# Patient Record
Sex: FEMALE | Race: WHITE | Employment: PART TIME | URBAN - METROPOLITAN AREA
[De-identification: names, ages, dates, MRNs, and addresses within clinical notes are randomized per-mention and may not be internally consistent; named-entity substitution may affect disease eponyms.]

---
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
## 2022-01-14 ENCOUNTER — IMMUNIZATION (OUTPATIENT)
Dept: NURSING | Facility: CLINIC | Age: 22
End: 2022-01-14
Attending: FAMILY MEDICINE
Payer: OTHER GOVERNMENT

## 2022-01-14 PROCEDURE — 0052A COVID-19,PF,PFIZER (12+ YRS): CPT

## 2022-01-14 PROCEDURE — 91305 COVID-19,PF,PFIZER (12+ YRS): CPT

## 2022-03-11 ENCOUNTER — OFFICE VISIT (OUTPATIENT)
Dept: FAMILY MEDICINE | Facility: CLINIC | Age: 22
End: 2022-03-11
Payer: OTHER GOVERNMENT

## 2022-03-11 VITALS
HEART RATE: 61 BPM | RESPIRATION RATE: 13 BRPM | OXYGEN SATURATION: 99 % | TEMPERATURE: 97 F | SYSTOLIC BLOOD PRESSURE: 125 MMHG | DIASTOLIC BLOOD PRESSURE: 78 MMHG | BODY MASS INDEX: 21.96 KG/M2 | WEIGHT: 144.4 LBS

## 2022-03-11 DIAGNOSIS — L21.9 SEBORRHEIC DERMATITIS OF SCALP: Primary | ICD-10-CM

## 2022-03-11 DIAGNOSIS — L70.0 ACNE VULGARIS: ICD-10-CM

## 2022-03-11 PROCEDURE — 99214 OFFICE O/P EST MOD 30 MIN: CPT | Performed by: NURSE PRACTITIONER

## 2022-03-11 RX ORDER — TRIAMCINOLONE ACETONIDE 0.25 MG/G
CREAM TOPICAL 2 TIMES DAILY
Qty: 80 G | Refills: 1 | Status: SHIPPED | OUTPATIENT
Start: 2022-03-11 | End: 2022-11-30

## 2022-03-11 RX ORDER — KETOCONAZOLE 20 MG/ML
SHAMPOO TOPICAL
Qty: 120 ML | Refills: 1 | Status: SHIPPED | OUTPATIENT
Start: 2022-03-11 | End: 2022-11-30

## 2022-03-11 RX ORDER — TRETINOIN 0.25 MG/G
CREAM TOPICAL AT BEDTIME
Qty: 45 G | Refills: 3 | Status: SHIPPED | OUTPATIENT
Start: 2022-03-11 | End: 2022-11-30

## 2022-03-11 ASSESSMENT — PATIENT HEALTH QUESTIONNAIRE - PHQ9
SUM OF ALL RESPONSES TO PHQ QUESTIONS 1-9: 17
10. IF YOU CHECKED OFF ANY PROBLEMS, HOW DIFFICULT HAVE THESE PROBLEMS MADE IT FOR YOU TO DO YOUR WORK, TAKE CARE OF THINGS AT HOME, OR GET ALONG WITH OTHER PEOPLE: SOMEWHAT DIFFICULT
SUM OF ALL RESPONSES TO PHQ QUESTIONS 1-9: 17

## 2022-03-11 ASSESSMENT — PAIN SCALES - GENERAL: PAINLEVEL: NO PAIN (0)

## 2022-03-11 NOTE — PATIENT INSTRUCTIONS
At [facility], we strive to deliver an exceptional experience to you, every time we see you. If you receive a survey, please complete it as we do value your feedback.  If you have MyChart, you can expect to receive results automatically within 24 hours of their completion.  Your provider will send a note interpreting your results as well.   If you do not have MyChart, you should receive your results in about a week by mail.    Your care team:                            Family Medicine Internal Medicine   MD Kiet Garza MD Shantel Branch-Fleming, MD Srinivasa Vaka, MD Katya Belousova, BLU Hadley CNP, MD ([facility]   Kingston Harper, MD Lianet Abebe MD Amelia Massimini APRN CNP Kim Thein, APRN CNP Bethany Templen, MD             Clinic hours: Monday - Thursday 7 am-6 pm; Fridays 7 am-5 pm.   Urgent care: Monday - Friday 10 am- 8 pm; Saturday and Sunday 9 am-5 pm.    Clinic: [phone]       [facility]: Monday - Thursday 8 am - 7 pm; Friday 8 am - 6 pm  [facility]: [phone]

## 2022-03-11 NOTE — PROGRESS NOTES
Assessment & Plan     Seborrheic dermatitis of scalp  Discussed multiple etiologies, treatment options.    - ketoconazole (NIZORAL) 2 % external shampoo; Apply to scalp, rinse after 5 minutes.  Repeat twice weekly for 2-4 weeks.  - triamcinolone (KENALOG) 0.025 % cream; Apply topically 2 times PRN daily to scalp at affected site parietal scalp and other tala of dermatitis as needed   Referral ordered if needed for persistent symptoms.   - Adult Dermatology Referral; Future    Acne vulgaris  Skin care reviewed.   - tretinoin (RETIN-A) 0.025 % external cream; Apply topically At Bedtime     Return in about 1 month (around 4/11/2022), or if symptoms worsen or fail to improve, for Follow up.    BLU Arriola [facility]     Cheli Inman is a 22 year old who presents for the following health issues     Patient reports ongoing skin concerns:      Describes dryness, flaking throughout scalp, +pruritis.  R frontal/parietal scalp with areas of erythema, skin irritation, possible hair loss/thinning.    Patient notes previous area on scalp with linear pattern of scaling, dryness, flaking - but resolved with use of selenium sulfide shampoo.    Would like to discuss treatment options for dandruff and flaking.  Has tried dandruff shampoos, scalp moisturizers, hair masks, etc, with no improvement.   No isolated lesions, no crusting/weeping.    Washing hair daily makes scalp symptoms worse, so cleans hair less frequently.     Also notes facial acne.  Has used adapalene in past, with/without topical clindamycin, would like to trial alternative.       Review of Systems   Constitutional, neuro, ENT, endocrine, pulmonary, cardiac, gastrointestinal, genitourinary, musculoskeletal, integument and psychiatric systems are negative, except as otherwise noted.      Objective    /78 (BP Location: Left arm, Patient Position: Sitting, Cuff Size: Adult Regular)   Pulse 61   Temp 97  F  (36.1  C) (Tympanic)   Resp 13   Wt 65.5 kg (144 lb 6.4 oz)   SpO2 99%   BMI 21.96 kg/m    Body mass index is 21.96 kg/m .  Physical Exam   GENERAL: healthy, alert and no distress  EYES: Eyes grossly normal to inspection, PERRL and conjunctivae and sclerae normal  HENT: ear canals and TM's normal, nose and mouth without ulcers or lesions  NECK: no adenopathy, no asymmetry, masses, or scars and thyroid normal to palpation  RESP: lungs clear to auscultation - no rales, rhonchi or wheezes  CV: regular rate and rhythm, normal S1 S2 no murmur  MS: no gross musculoskeletal defects noted, no edema  SKIN: moderate facial acne.  Scalp with diffuse white flaking and dry skin. Few localised areas of erythema, rough skin. No scaling, no breakdown, no isolated papular lesions.   PSYCH: mentation appears normal, affect normal/bright

## 2022-09-03 ENCOUNTER — HEALTH MAINTENANCE LETTER (OUTPATIENT)
Age: 22
End: 2022-09-03

## 2022-11-30 ENCOUNTER — MYC MEDICAL ADVICE (OUTPATIENT)
Dept: FAMILY MEDICINE | Facility: CLINIC | Age: 22
End: 2022-11-30

## 2022-11-30 ENCOUNTER — OFFICE VISIT (OUTPATIENT)
Dept: FAMILY MEDICINE | Facility: CLINIC | Age: 22
End: 2022-11-30
Payer: OTHER GOVERNMENT

## 2022-11-30 VITALS
HEART RATE: 87 BPM | SYSTOLIC BLOOD PRESSURE: 122 MMHG | WEIGHT: 137.1 LBS | HEIGHT: 68 IN | BODY MASS INDEX: 20.78 KG/M2 | TEMPERATURE: 99.1 F | DIASTOLIC BLOOD PRESSURE: 82 MMHG | RESPIRATION RATE: 11 BRPM | OXYGEN SATURATION: 100 %

## 2022-11-30 DIAGNOSIS — Z02.89 ENCOUNTER FOR COMPLETION OF FORM WITH PATIENT: ICD-10-CM

## 2022-11-30 DIAGNOSIS — Z23 NEED FOR VACCINATION: ICD-10-CM

## 2022-11-30 DIAGNOSIS — F32.1 CURRENT MODERATE EPISODE OF MAJOR DEPRESSIVE DISORDER WITHOUT PRIOR EPISODE (H): Primary | ICD-10-CM

## 2022-11-30 DIAGNOSIS — L70.0 ACNE VULGARIS: ICD-10-CM

## 2022-11-30 PROCEDURE — 99214 OFFICE O/P EST MOD 30 MIN: CPT | Mod: 25 | Performed by: PHYSICIAN ASSISTANT

## 2022-11-30 PROCEDURE — 91312 COVID-19 VACCINE BIVALENT BOOSTER 12+ (PFIZER): CPT | Performed by: PHYSICIAN ASSISTANT

## 2022-11-30 PROCEDURE — 0124A COVID-19 VACCINE BIVALENT BOOSTER 12+ (PFIZER): CPT | Performed by: PHYSICIAN ASSISTANT

## 2022-11-30 RX ORDER — ADAPALENE 45 G/G
GEL TOPICAL AT BEDTIME
Qty: 45 G | Refills: 5 | Status: SHIPPED | OUTPATIENT
Start: 2022-11-30

## 2022-11-30 RX ORDER — TRETINOIN 0.25 MG/G
CREAM TOPICAL AT BEDTIME
Qty: 45 G | Refills: 5 | Status: SHIPPED | OUTPATIENT
Start: 2022-11-30

## 2022-11-30 ASSESSMENT — PAIN SCALES - GENERAL: PAINLEVEL: NO PAIN (0)

## 2022-11-30 ASSESSMENT — PATIENT HEALTH QUESTIONNAIRE - PHQ9
SUM OF ALL RESPONSES TO PHQ QUESTIONS 1-9: 17
SUM OF ALL RESPONSES TO PHQ QUESTIONS 1-9: 17
10. IF YOU CHECKED OFF ANY PROBLEMS, HOW DIFFICULT HAVE THESE PROBLEMS MADE IT FOR YOU TO DO YOUR WORK, TAKE CARE OF THINGS AT HOME, OR GET ALONG WITH OTHER PEOPLE: SOMEWHAT DIFFICULT

## 2022-11-30 ASSESSMENT — ENCOUNTER SYMPTOMS
NERVOUS/ANXIOUS: 1
ANOREXIA: 1

## 2022-11-30 NOTE — TELEPHONE ENCOUNTER
Form completed by staff and provider. Waiting to hear back from patient where form should be faxed. Holding upside down on my desk.

## 2022-11-30 NOTE — TELEPHONE ENCOUNTER
Reason for call:  Form  Reason for Call:  Form, our goal is to have forms completed with 72 hours, however, some forms may require a visit or additional information.    Type of letter, form or note:  school     Who is the form from?: patient sent in [employer] message. I printed it out and completed section with patient still in room.  (if other please explain)    Where did the form come from: Patient or family brought in       What clinic location was the form placed at?: [facility] [phone]    Where the form was placed: rory Wilson Box/Folder    What number is listed as a contact on the form?:        Additional comments:     Call taken on 11/30/2022 at 4:42 PM by Silvano Milton MA

## 2022-11-30 NOTE — PATIENT INSTRUCTIONS
You have been prescribed a medication to help with mood or anxiety: sertraline.    Take this medication once daily.     If instructed by your health care provider you may be advised to take a half tablet (half dose) daily for the first few days to reduce side effects and ease into a new medication.     Common side effects are nausea, headache, stomach upset or mild diarrhea. If you find it makes you sleepy, plan to take it at bedtime. Most of these side effects gradually improve over the first week of use.     It can take up to 2 weeks to notice initial benefits from the medication (ie increase in energy), and up to 4-6 weeks for other symptom improvement.     If you experience a worsening in mood or thoughts of self harm, seek help immediately.   Any Emergency Department if in crisis. After hours crisis line at [phone] or [phone]. [place] Crisis Text Line: Text [place] to 977522  or  Suicide LifeLine Chat: suicidepreventionRock[facility] Solos[facility].org/chat/    Non pharmacological treatment for depression and/or anxiety:  Counseling, Therapy or Cognitive Behavioral Therapy: In combination with medications, talking with a professional therapist can help reduce and improve symptoms of depression or anxiety. I can place a referral for you to arrange counseling through [employer]. You can also call your insurance to find covered therapists in your area. If the first person you see is not a good fit it is ok to try a different counselor.    Regular exercise reduces anxiety and depression symptoms. Being active 30-40 minutes per day 3-5 times per week can improve symptoms.    Reduce or avoid caffeine and alcohol. Caffeine can disrupt sleep and increase anxiety symptoms.  Alcohol has a depressive effect on the brain and works against the action of many medications.    Plan to follow up in 3-4 weeks .

## 2022-11-30 NOTE — PROGRESS NOTES
Assessment & Plan     Current moderate episode of major depressive disorder without prior episode (H)  New medication start  Discussed SSRI medication options, possible side effects, how to take, risks vs benefits, onset of sx improvement and alternatives.   Counseling referral discussed- can be seen on campus at [place] - was a good fit in the spring.   Questions answered  Start medication as below.   Follow up 3-4 weeks sooner if worsening. Crises resources discussed.  - sertraline (ZOLOFT) 50 MG tablet; Take 1 tablet (50 mg) by mouth daily    Acne vulgaris  Refilled medications  differin for neck  Retin A for face  - adapalene (DIFFERIN) 0.1 % external gel; Apply topically At Bedtime  - tretinoin (RETIN-A) 0.025 % external cream; Apply topically At Bedtime    Need for vaccination  Given   - REVIEW OF HEALTH MAINTENANCE PROTOCOL ORDERS  - COVID-19 VACCINE BIVALENT BOOSTER 12+ (PFIZER)    Completion of form   To start nursing clinicals  Vaccines updated today  In her email was form to be completed- Exam done and signed form.     Depression Screening Follow Up    PHQ 11/30/2022   PHQ-9 Total Score 17   Q9: Thoughts of better off dead/self-harm past 2 weeks Not at all       Follow Up: The patient was instructed to contact clinic for worsening symptoms, non-improvement in time frame as expected/discussed, and for questions regarding medications or treatment plan. For virtual visits, the patient was advised to be seen for in person evaluation if symptoms or condition are worsening or non-improvement as expected.       Return in about 4 weeks (around 12/28/2022).    GLENDA Magana [facility] HUNG Inman is a 22 year old accompanied by her self, presenting for the following health issues:  Anxiety and Depression      Anxiety  This is a new problem. The current episode started more than 1 month ago. The problem occurs constantly. The problem has been gradually worsening.  "Associated symptoms include anorexia. The symptoms are aggravated by stress. She has tried nothing for the symptoms.   History of Present Illness       Reason for visit:  Mental health and to update vaccinations (need online copies of vaccibation conformation for school)  Symptoms include:  Depression, loss of interest, changes in eating habits  Symptom intensity:  Moderate  Symptom progression:  Worsening  What makes it worse:  Recent family matters    She eats 2-3 servings of fruits and vegetables daily.She consumes 0 sweetened beverage(s) daily.She exercises with enough effort to increase her heart rate 20 to 29 minutes per day.  She exercises with enough effort to increase her heart rate 3 or less days per week.   She is taking medications regularly.    Today's PHQ-9         PHQ-9 Total Score: 17    PHQ-9 Q9 Thoughts of better off dead/self-harm past 2 weeks :   Not at all    How difficult have these problems made it for you to do your work, take care of things at home, or get along with other people: Somewhat difficult      Mental health   Feeling down, depression.   Low appetite and not eating well. Doesn't want to lose eight just hard to eat  Sleep- intermittently interrupted . Hard to fall asleep .   Concentration for school is good  Low motivation for school work. Avoiding checking emails  No SI/HI   Family issues and situation contributing.  Family stressors - mom went to skilled nursing for domestic issues w/boyfriend. Parents . Family has relied on Henny to manage issues w/mom.   Saw counselor in school at [place] last spring 2022 and was really helpful.  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  Not sure if worrying a lot.   Hard time relaxing.  Feels like when anxious can self sabotage.   Father has told her - \"you really have it together\" of the siblings (has older brother and younger sister). A lot of responsibility in the family.   Sister suicide attempt recently.   Never been on " "medications before.     PHQ 3/11/2022 11/30/2022   PHQ-9 Total Score 17 17   Q9: Thoughts of better off dead/self-harm past 2 weeks Not at all Not at all     No flowsheet data found.      Review of Systems   Gastrointestinal: Positive for anorexia.   Psychiatric/Behavioral: The patient is nervous/anxious.             Objective    /82 (BP Location: Left arm, Patient Position: Sitting, Cuff Size: Adult Regular)   Pulse 87   Temp 99.1  F (37.3  C) (Temporal)   Resp 11   Ht 1.738 m (5' 8.43\")   Wt 62.2 kg (137 lb 1.6 oz)   LMP 11/27/2022 (Approximate)   SpO2 100%   BMI 20.59 kg/m    Body mass index is 20.59 kg/m .  Physical Exam   GENERAL: healthy, alert and no distress  EYES: Eyes grossly normal to inspection, PERRL and conjunctivae and sclerae normal  HENT: ear canals and TM's normal, nose and mouth without ulcers or lesions  NECK: no adenopathy, no asymmetry, masses, or scars and thyroid normal to palpation  RESP: lungs clear to auscultation - no rales, rhonchi or wheezes  CV: regular rate and rhythm, normal S1 S2, no S3 or S4, no murmur, click or rub, no peripheral edema and peripheral pulses strong  ABDOMEN: soft, nontender, no masses and bowel sounds normal  MS: no gross musculoskeletal defects noted, no edema  NEURO: Normal strength and tone, mentation intact and speech normal  PSYCH: mentation appears normal, affect normal/bright, neatly groomed casually dressed, good eye contact, normal insight, no SI/HI  LYMPH: normal ant/post cervical, supraclavicular nodes                    "

## 2022-12-01 ASSESSMENT — ANXIETY QUESTIONNAIRES
GAD7 TOTAL SCORE: 10
IF YOU CHECKED OFF ANY PROBLEMS ON THIS QUESTIONNAIRE, HOW DIFFICULT HAVE THESE PROBLEMS MADE IT FOR YOU TO DO YOUR WORK, TAKE CARE OF THINGS AT HOME, OR GET ALONG WITH OTHER PEOPLE: SOMEWHAT DIFFICULT
6. BECOMING EASILY ANNOYED OR IRRITABLE: MORE THAN HALF THE DAYS
2. NOT BEING ABLE TO STOP OR CONTROL WORRYING: SEVERAL DAYS
5. BEING SO RESTLESS THAT IT IS HARD TO SIT STILL: SEVERAL DAYS
1. FEELING NERVOUS, ANXIOUS, OR ON EDGE: SEVERAL DAYS
3. WORRYING TOO MUCH ABOUT DIFFERENT THINGS: MORE THAN HALF THE DAYS
GAD7 TOTAL SCORE: 10
7. FEELING AFRAID AS IF SOMETHING AWFUL MIGHT HAPPEN: SEVERAL DAYS

## 2022-12-01 ASSESSMENT — PATIENT HEALTH QUESTIONNAIRE - PHQ9: 5. POOR APPETITE OR OVEREATING: MORE THAN HALF THE DAYS

## 2022-12-06 NOTE — TELEPHONE ENCOUNTER
Pt read [employer] but did not provide fax number. Mailing copy to patient. Sent copy to scanning

## 2022-12-23 ENCOUNTER — OFFICE VISIT (OUTPATIENT)
Dept: FAMILY MEDICINE | Facility: CLINIC | Age: 22
End: 2022-12-23
Payer: OTHER GOVERNMENT

## 2022-12-23 VITALS
OXYGEN SATURATION: 97 % | BODY MASS INDEX: 20.95 KG/M2 | HEART RATE: 93 BPM | WEIGHT: 138.2 LBS | HEIGHT: 68 IN | TEMPERATURE: 98.4 F | DIASTOLIC BLOOD PRESSURE: 77 MMHG | RESPIRATION RATE: 18 BRPM | SYSTOLIC BLOOD PRESSURE: 117 MMHG

## 2022-12-23 DIAGNOSIS — K92.1 BLOOD IN STOOL: Primary | ICD-10-CM

## 2022-12-23 DIAGNOSIS — Z02.1 PRE-EMPLOYMENT EXAMINATION: ICD-10-CM

## 2022-12-23 PROBLEM — F33.9 MAJOR DEPRESSION, RECURRENT (H): Status: ACTIVE | Noted: 2022-12-23

## 2022-12-23 LAB
CRP SERPL-MCNC: 6.1 MG/L (ref 0–8)
ERYTHROCYTE [SEDIMENTATION RATE] IN BLOOD BY WESTERGREN METHOD: 7 MM/HR (ref 0–20)
HGB BLD-MCNC: 13.6 G/DL (ref 11.7–15.7)

## 2022-12-23 PROCEDURE — 85652 RBC SED RATE AUTOMATED: CPT | Performed by: FAMILY MEDICINE

## 2022-12-23 PROCEDURE — 36415 COLL VENOUS BLD VENIPUNCTURE: CPT | Performed by: FAMILY MEDICINE

## 2022-12-23 PROCEDURE — 99214 OFFICE O/P EST MOD 30 MIN: CPT | Performed by: FAMILY MEDICINE

## 2022-12-23 PROCEDURE — 86364 TISS TRNSGLTMNASE EA IG CLAS: CPT | Performed by: FAMILY MEDICINE

## 2022-12-23 PROCEDURE — 86481 TB AG RESPONSE T-CELL SUSP: CPT | Performed by: FAMILY MEDICINE

## 2022-12-23 PROCEDURE — 86140 C-REACTIVE PROTEIN: CPT | Performed by: FAMILY MEDICINE

## 2022-12-23 PROCEDURE — 85018 HEMOGLOBIN: CPT | Performed by: FAMILY MEDICINE

## 2022-12-23 ASSESSMENT — PAIN SCALES - GENERAL: PAINLEVEL: NO PAIN (0)

## 2022-12-23 NOTE — PROGRESS NOTES
Assessment & Plan     Blood in stool  -Has been noticing blood in the stool intermittently for the past 2 months.  -Denied history of constipation.  Has been having regular bowel movement.  -Hurts during defecation.   -History of high lactose intolerance and has been doing good controlling her diet.  -She was told to have possible celiac disease and had colonoscopy, endoscopy with biopsy in 2014.  -As per pathology results in 2014- Two of the four duodenal biopsies show villous blunting and flattening with increased epithelial lymphocytes.  The findings are nonspecific but in the appropriate clinical contest could support a diagnosis of celiac disease.   -Serology has been negative (except for DGP IgG which is positive) in 2014    PLAN:  -Ddx: Fissure/hemorrhoids/possible IBD/celiac  -Recommended high-fiber diet, good hydration, Metamucil, anorectal lidocaine gel to help with pain before defecation.  -Due to concerns of possible celiac in the past, ordered ESR, CRP, TTG.  -Henny will notify me in 2 to 3 weeks how she is doing.  If she still notices blood in the stool, will send a referral to gastroenterology.      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  - Quantiferon TB Gold Plus; Future  - Quantiferon TB Gold Plus                 No follow-ups on file.    Lester Thomason MD  [facility]    Cheli Inman is a 22 year old}, presenting for the following health issues:  Rectal Problem (Blood in stool since October )      History of Present Illness       Reason for visit:  Blood in stool/tuberculosis antibody test  Symptom onset:  More than a  month  Symptoms include:  Painful stools and blood  Symptom intensity:  Moderate  Symptom progression:  Staying the same  Had these symptoms before:  No  What makes it worse:  Hard stools  What makes it better:  Loose stools    She eats 2-3 servings of fruits and vegetables daily.She consumes 0 sweetened beverage(s) daily.She exercises with enough effort to increase her heart rate 20 to 29 minutes per day.  She exercises with enough effort to increase her heart rate 5 days per week.   She is taking medications regularly.     -Since October had diarrhea, blood in stool intermittently  -Lots of blood one episode, and bleeding with pain mainly during defecation  -Denied constipation, told to have normal BM, taking lots of fibers  -Hurts before bowel movements.  -Told to have h/o celiac disease  -Stopping gluten helped for lactose intolerance.      Review of Systems   Constitutional, HEENT, cardiovascular, pulmonary, gi and gu systems are negative, except as otherwise noted.      Objective    LMP 11/27/2022 (Approximate)   There is no height or weight on file to calculate BMI.  Physical Exam   GENERAL: healthy, alert and no distress  NECK: no adenopathy, no asymmetry, masses, or scars and thyroid normal to palpation  RESP: lungs clear to auscultation - no rales, rhonchi or wheezes  CV: regular rate and rhythm, normal S1 S2, no S3 or S4, no murmur, click or rub, no peripheral edema and peripheral pulses strong  ABDOMEN: soft, nontender, no hepatosplenomegaly, no masses and bowel sounds normal  MS: no gross musculoskeletal defects noted, no edema

## 2022-12-25 LAB
GAMMA INTERFERON BACKGROUND BLD IA-ACNC: 0.02 IU/ML
M TB IFN-G BLD-IMP: NEGATIVE
M TB IFN-G CD4+ BCKGRND COR BLD-ACNC: 9.98 IU/ML
MITOGEN IGNF BCKGRD COR BLD-ACNC: 0.01 IU/ML
MITOGEN IGNF BCKGRD COR BLD-ACNC: 0.01 IU/ML
QUANTIFERON MITOGEN: 10 IU/ML
QUANTIFERON NIL TUBE: 0.02 IU/ML
QUANTIFERON TB1 TUBE: 0.03 IU/ML
QUANTIFERON TB2 TUBE: 0.03

## 2022-12-26 LAB
TTG IGA SER-ACNC: 0.3 U/ML
TTG IGG SER-ACNC: 0.7 U/ML

## 2023-09-30 ENCOUNTER — HEALTH MAINTENANCE LETTER (OUTPATIENT)
Age: 23
End: 2023-09-30

## 2023-12-19 ENCOUNTER — OFFICE VISIT (OUTPATIENT)
Dept: FAMILY MEDICINE | Facility: CLINIC | Age: 23
End: 2023-12-19
Payer: OTHER GOVERNMENT

## 2023-12-19 VITALS
TEMPERATURE: 98 F | WEIGHT: 141 LBS | HEART RATE: 74 BPM | SYSTOLIC BLOOD PRESSURE: 116 MMHG | HEIGHT: 68 IN | DIASTOLIC BLOOD PRESSURE: 77 MMHG | OXYGEN SATURATION: 100 % | RESPIRATION RATE: 16 BRPM | BODY MASS INDEX: 21.37 KG/M2

## 2023-12-19 DIAGNOSIS — Z11.1 SCREENING EXAMINATION FOR PULMONARY TUBERCULOSIS: ICD-10-CM

## 2023-12-19 DIAGNOSIS — Z23 NEED FOR VACCINATION: Primary | ICD-10-CM

## 2023-12-19 PROCEDURE — 99213 OFFICE O/P EST LOW 20 MIN: CPT

## 2023-12-19 PROCEDURE — 90480 ADMN SARSCOV2 VAC 1/ONLY CMP: CPT

## 2023-12-19 PROCEDURE — 91320 SARSCV2 VAC 30MCG TRS-SUC IM: CPT

## 2023-12-19 PROCEDURE — 36415 COLL VENOUS BLD VENIPUNCTURE: CPT

## 2023-12-19 PROCEDURE — 86481 TB AG RESPONSE T-CELL SUSP: CPT

## 2023-12-19 ASSESSMENT — PAIN SCALES - GENERAL: PAINLEVEL: NO PAIN (0)

## 2023-12-19 ASSESSMENT — PATIENT HEALTH QUESTIONNAIRE - PHQ9: SUM OF ALL RESPONSES TO PHQ QUESTIONS 1-9: 5

## 2023-12-19 NOTE — PROGRESS NOTES
"  Assessment & Plan     Need for vaccination  - COVID-19 12+ (2023-24) (PFIZER)    Screening examination for pulmonary tuberculosis  - Quantiferon TB Gold Plus    Follow-up-with primary as discussed for routine Pap/annual.      BLU Barreto 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  No known exposures.  No cough.    She would also like a COVID shot today            12/19/2023    10:30 AM   Additional Questions   Roomed by Onesimo MACDONALD LPN   Accompanied by Self         12/19/2023    10:30 AM   Patient Reported Additional Medications   Patient reports taking the following new medications None       HPI     Review of Systems   Constitutional, HEENT, cardiovascular, pulmonary, gi and gu systems are negative, except as otherwise noted.      Objective    /77   Pulse 74   Temp 98  F (36.7  C) (Tympanic)   Resp 16   Ht 1.734 m (5' 8.25\")   Wt 64 kg (141 lb)   LMP 11/15/2023 (Exact Date)   SpO2 100%   Breastfeeding No   BMI 21.28 kg/m    Body mass index is 21.28 kg/m .  Physical Exam   GENERAL: healthy, alert and no distress  EYES: Eyes grossly normal to inspection, PERRL and conjunctivae and sclerae normal  Neck: No visible JVD or lymphadenopathy   RESP: symmetrical rise in chest   CV: No peripheral edema notable   MS: no gross musculoskeletal defects noted  SKIN: no suspicious lesions or rashes  PSYCH: mentation appears normal, affect normal/bright              "

## 2023-12-20 LAB
GAMMA INTERFERON BACKGROUND BLD IA-ACNC: 0 IU/ML
M TB IFN-G BLD-IMP: NEGATIVE
M TB IFN-G CD4+ BCKGRND COR BLD-ACNC: 10 IU/ML
MITOGEN IGNF BCKGRD COR BLD-ACNC: 0 IU/ML
MITOGEN IGNF BCKGRD COR BLD-ACNC: 0 IU/ML
QUANTIFERON MITOGEN: 10 IU/ML
QUANTIFERON NIL TUBE: 0 IU/ML
QUANTIFERON TB1 TUBE: 0 IU/ML
QUANTIFERON TB2 TUBE: 0

## 2024-10-28 ENCOUNTER — TELEPHONE (OUTPATIENT)
Dept: FAMILY MEDICINE | Facility: CLINIC | Age: 24
End: 2024-10-28

## 2024-10-28 NOTE — TELEPHONE ENCOUNTER
Patient Quality Outreach    Patient is due for the following:   Cervical Cancer Screening - PAP Needed  Depression  -  PHQ-9 needed and DAP  Physical Preventive Adult Physical    Next Steps:   Schedule a Adult Preventative    Type of outreach:    Sent [employer] message.    Next Steps:  Reach out within 90 days via Letter.    Max number of attempts reached: No. Will try again in 90 days if patient still on fail list.    Questions for provider review:    None           Louise Vasques MA

## 2024-11-19 ENCOUNTER — PATIENT OUTREACH (OUTPATIENT)
Dept: FAMILY MEDICINE | Facility: CLINIC | Age: 24
End: 2024-11-19

## 2024-11-19 NOTE — TELEPHONE ENCOUNTER
Patient Quality Outreach    Patient is due for the following:   Cervical Cancer Screening - PAP Needed    Action(s) Taken:   Schedule a office visit for pap    Type of outreach:    Sent [email or web address] message.    Questions for provider review:    None           Lizet Bey CMA  Chart routed to Care Team.

## 2024-11-19 NOTE — LETTER
November 19, 2024    To  Henny Arriola  [address]  [place] [address]    Your team at [facility] cares about your health. We have reviewed your chart and based on our findings; we are making the following recommendations to better manage your health.     You are in particular need of attention regarding the following:     Schedule a primary care office visit with your provider for a Pap Smear to screen for Cervical Cancer.    If you have already completed these items, please contact the clinic via phone or   [facility] so your care team can review and update your records. Thank you for   choosing [facility] for your healthcare needs. For any questions,   concerns, or to schedule an appointment please contact our clinic.    Healthy Regards,      Your [facility] Care Team

## 2024-11-23 ENCOUNTER — HEALTH MAINTENANCE LETTER (OUTPATIENT)
Age: 24
End: 2024-11-23